# Patient Record
Sex: FEMALE | Race: WHITE | NOT HISPANIC OR LATINO | Employment: UNEMPLOYED
[De-identification: names, ages, dates, MRNs, and addresses within clinical notes are randomized per-mention and may not be internally consistent; named-entity substitution may affect disease eponyms.]

---

## 2018-03-30 ENCOUNTER — RECORDS - HEALTHEAST (OUTPATIENT)
Dept: ADMINISTRATIVE | Facility: OTHER | Age: 23
End: 2018-03-30

## 2018-11-25 ENCOUNTER — HEALTH MAINTENANCE LETTER (OUTPATIENT)
Age: 23
End: 2018-11-25

## 2019-05-12 ENCOUNTER — HOSPITAL ENCOUNTER (EMERGENCY)
Facility: CLINIC | Age: 24
Discharge: HOME | End: 2019-05-12
Attending: FAMILY MEDICINE | Admitting: FAMILY MEDICINE
Payer: COMMERCIAL

## 2019-05-12 VITALS
DIASTOLIC BLOOD PRESSURE: 88 MMHG | TEMPERATURE: 97 F | HEIGHT: 67 IN | HEART RATE: 111 BPM | RESPIRATION RATE: 18 BRPM | OXYGEN SATURATION: 97 % | BODY MASS INDEX: 19.62 KG/M2 | WEIGHT: 125 LBS | SYSTOLIC BLOOD PRESSURE: 125 MMHG

## 2019-05-12 DIAGNOSIS — Z20.2 STD EXPOSURE: ICD-10-CM

## 2019-05-12 DIAGNOSIS — N89.8 VAGINAL DISCHARGE: ICD-10-CM

## 2019-05-12 LAB
SPECIMEN SOURCE: NORMAL
WET PREP SPEC: NORMAL

## 2019-05-12 PROCEDURE — 99282 EMERGENCY DEPT VISIT SF MDM: CPT | Mod: Z6 | Performed by: FAMILY MEDICINE

## 2019-05-12 PROCEDURE — 99283 EMERGENCY DEPT VISIT LOW MDM: CPT | Performed by: FAMILY MEDICINE

## 2019-05-12 PROCEDURE — 87491 CHLMYD TRACH DNA AMP PROBE: CPT | Performed by: FAMILY MEDICINE

## 2019-05-12 PROCEDURE — 87210 SMEAR WET MOUNT SALINE/INK: CPT | Performed by: FAMILY MEDICINE

## 2019-05-12 PROCEDURE — 87591 N.GONORRHOEAE DNA AMP PROB: CPT | Performed by: FAMILY MEDICINE

## 2019-05-12 ASSESSMENT — MIFFLIN-ST. JEOR: SCORE: 1364.63

## 2019-05-12 NOTE — ED PROVIDER NOTES
History     Chief Complaint   Patient presents with     Exposure to STD     pt was seen and tx for gonorrhea and chlamydia.  she wants a recheck after finishing course of anbx. pt cont to have vag dc      HPI  Milly Augustin is a 23 year old female with history of gonorrhea and chlamydia who presents to the ED with STD follow-up. The patient was treated for gonorrhea and chlamydia one month ago, diagnosed with a urine test.  She says that she took a pill twice daily for 10 days which I suspect was doxycycline.  She is currently not having dysuria.  She reports only mild vaginal discharge, but no other symptoms. She presents to the ED today to make sure STDs are gone and to check for vaginal infection..     Allergies:  No Known Allergies    Problem List:    Patient Active Problem List    Diagnosis Date Noted     Contraception 05/14/2015     Priority: Medium     Major depressive disorder, single episode, mild (H) 01/15/2012     Priority: Medium     Anxiety 12/20/2011     Priority: Medium     Psoriasis 04/14/2009     Priority: Medium     Pain in limb 08/13/2007     Priority: Medium        Past Medical History:    Past Medical History:   Diagnosis Date     Depressive disorder 1/15/12     Other abnormal heart sounds        Past Surgical History:    No past surgical history on file.    Family History:    Family History   Problem Relation Age of Onset     Depression Sister      Anxiety Disorder Sister      Asthma Sister        Social History:  Marital Status:  Single [1]  Social History     Tobacco Use     Smoking status: Current Every Day Smoker     Packs/day: 0.50     Years: 5.00     Pack years: 2.50     Types: Cigarettes, Bidis     Start date: 11/13/2013     Smokeless tobacco: Never Used   Substance Use Topics     Alcohol use: No     Comment: 10/22/13 treatment     Drug use: No     Comment: pot, K2, amphetimines none since treatment        Medications:      No current outpatient medications on file.      Review of  "Systems   Constitutional: Negative for chills and fever.   Respiratory: Negative for cough.    Gastrointestinal: Negative for diarrhea and nausea.   Genitourinary: Positive for vaginal discharge (Mild). Negative for dysuria and frequency.   Musculoskeletal: Negative.    Skin: Negative for rash.   Neurological: Negative.    Unremarkable unless noted above.     Physical Exam   BP: 125/88  Pulse: 111  Temp: 97  F (36.1  C)  Resp: 18  Height: 170.2 cm (5' 7\")  Weight: 56.7 kg (125 lb)  SpO2: 97 %      Physical Exam   Constitutional: She appears well-developed.   HENT:   Head: Normocephalic.   Eyes: No scleral icterus.   Neck: No thyromegaly present.   Cardiovascular: Regular rhythm.   Pulmonary/Chest: No respiratory distress.   Abdominal: She exhibits no distension.   Neurological: Coordination normal.   Skin: No rash noted.   Psychiatric: She has a normal mood and affect.       ED Course        Procedures               Critical Care time:  none               Results for orders placed or performed during the hospital encounter of 05/12/19 (from the past 24 hour(s))   Wet prep   Result Value Ref Range    Specimen Description Vagina     Wet Prep No yeast seen     Wet Prep No clue cells seen     Wet Prep No Trichomonas seen     Wet Prep Few  WBC'S seen          Medications - No data to display     10:12 AM Patient Assessed.     Assessments & Plan (with Medical Decision Making)     Patient presented as noted above.  Chlamydia and gonorrhea labs are pending.  Her wet prep was unremarkable.  She was advised of results.  She can contact the [place] clinic for results in a couple of days.  Safe sex emphasized.  She voices understanding of recommendations.      I have reviewed the nursing notes.    I have reviewed the findings, diagnosis, plan and need for follow up with the patient.          Medication List      There are no discharge medications for this visit.         Final diagnoses:   STD exposure   Vaginal discharge "     This document serves as a record of the services and decisions personally performed and made by William Estrada MD. It was created on HIS/HER behalf by   Kayleigh Kaye, a trained medical scribe. The creation of this document is based the provider's statements to the medical scribe.  Kayleigh Kaye 10:38 AM 5/12/2019    Provider:   The information in this document, created by the medical scribe for me, accurately reflects the services I personally performed and the decisions made by me. I have reviewed and approved this document for accuracy prior to leaving the patient care area.  William Estrada MD 10:38 AM 5/12/2019 5/12/2019   [facility] EMERGENCY DEPARTMENT     William Estrada MD  05/13/19 0705

## 2019-05-12 NOTE — ED NOTES
Patient treated for STD one month ago.  Now here today to make sure its gone away.  States does have some vaginal discharge.  No urinary symptoms.  No other complaints

## 2019-05-12 NOTE — DISCHARGE INSTRUCTIONS
For STD test results, I suggest that you contact the [facility] at [phone].    The wet prep test was negative for vaginal infection.

## 2019-05-12 NOTE — ED AVS SNAPSHOT
[facility] Emergency Department  [address] [address]  Phone:  [phone]  Fax:  [phone]                                    Milly Augustin   MRN: [id number]    Department:  [facility] Emergency Department   Date of Visit:  5/12/2019           After Visit Summary Signature Page    I have received my discharge instructions, and my questions have been answered. I have discussed any challenges I see with this plan with the nurse or doctor.    ..........................................................................................................................................  Patient/Patient Representative Signature      ..........................................................................................................................................  Patient Representative Print Name and Relationship to Patient    ..................................................               ................................................  Date                                   Time    ..........................................................................................................................................  Reviewed by Signature/Title    ...................................................              ..............................................  Date                                               Time          22EPIC Rev 08/18

## 2019-05-13 LAB
C TRACH DNA SPEC QL NAA+PROBE: NEGATIVE
N GONORRHOEA DNA SPEC QL NAA+PROBE: NEGATIVE
SPECIMEN SOURCE: NORMAL
SPECIMEN SOURCE: NORMAL

## 2019-05-13 ASSESSMENT — ENCOUNTER SYMPTOMS
DIARRHEA: 0
COUGH: 0
NEUROLOGICAL NEGATIVE: 1
FREQUENCY: 0
MUSCULOSKELETAL NEGATIVE: 1
DYSURIA: 0
FEVER: 0
CHILLS: 0
NAUSEA: 0

## 2019-11-03 ENCOUNTER — HEALTH MAINTENANCE LETTER (OUTPATIENT)
Age: 24
End: 2019-11-03

## 2020-01-29 ENCOUNTER — HOSPITAL ENCOUNTER (EMERGENCY)
Facility: CLINIC | Age: 25
Discharge: HOME | End: 2020-01-29
Attending: NURSE PRACTITIONER | Admitting: NURSE PRACTITIONER
Payer: COMMERCIAL

## 2020-01-29 VITALS
TEMPERATURE: 98.1 F | SYSTOLIC BLOOD PRESSURE: 135 MMHG | RESPIRATION RATE: 16 BRPM | OXYGEN SATURATION: 99 % | DIASTOLIC BLOOD PRESSURE: 97 MMHG

## 2020-01-29 DIAGNOSIS — K08.89 PAIN, DENTAL: ICD-10-CM

## 2020-01-29 PROCEDURE — 99283 EMERGENCY DEPT VISIT LOW MDM: CPT | Mod: 25 | Performed by: NURSE PRACTITIONER

## 2020-01-29 PROCEDURE — 64400 NJX AA&/STRD TRIGEMINAL NRV: CPT | Performed by: NURSE PRACTITIONER

## 2020-01-29 PROCEDURE — 64400 NJX AA&/STRD TRIGEMINAL NRV: CPT | Mod: Z6 | Performed by: NURSE PRACTITIONER

## 2020-01-29 PROCEDURE — 99284 EMERGENCY DEPT VISIT MOD MDM: CPT | Mod: 25 | Performed by: NURSE PRACTITIONER

## 2020-01-29 RX ORDER — AMOXICILLIN 500 MG/1
500 CAPSULE ORAL 3 TIMES DAILY
Qty: 30 CAPSULE | Refills: 0 | Status: SHIPPED | OUTPATIENT
Start: 2020-01-29 | End: 2020-07-19

## 2020-01-29 RX ORDER — OXYCODONE AND ACETAMINOPHEN 5; 325 MG/1; MG/1
1 TABLET ORAL EVERY 6 HOURS PRN
Qty: 12 TABLET | Refills: 0 | Status: SHIPPED | OUTPATIENT
Start: 2020-01-29 | End: 2020-07-19

## 2020-01-29 NOTE — ED AVS SNAPSHOT
[facility] Emergency Department  [address] [place] [address]  Phone:  [phone]  Fax:  [phone]                                    Milly Augustin   MRN: [id number]    Department:  [facility] Emergency Department   Date of Visit:  1/29/2020           After Visit Summary Signature Page    I have received my discharge instructions, and my questions have been answered. I have discussed any challenges I see with this plan with the nurse or doctor.    ..........................................................................................................................................  Patient/Patient Representative Signature      ..........................................................................................................................................  Patient Representative Print Name and Relationship to Patient    ..................................................               ................................................  Date                                   Time    ..........................................................................................................................................  Reviewed by Signature/Title    ...................................................              ..............................................  Date                                               Time          22EPIC Rev 08/18

## 2020-01-30 NOTE — ED PROVIDER NOTES
History     Chief Complaint   Patient presents with     Dental Pain     HPI  Milly Augustin is a 24 year old female who presents to the emergency room today with left upper dental pain for the last 2 days.  Patient reports the pain is progressive in etiology, laying down makes it worse, chewing makes it worse.  Ibuprofen/Tylenol have helped minimally.  Patient denies any facial swelling, fever, chills, nausea, vomiting.  Patient has not been to a dentist in at least over 2 years.    Allergies:  Allergies   Allergen Reactions     No Known Allergies        Problem List:    Patient Active Problem List    Diagnosis Date Noted     Contraception 05/14/2015     Priority: Medium     Major depressive disorder, single episode, mild (H) 01/15/2012     Priority: Medium     Anxiety 12/20/2011     Priority: Medium     Psoriasis 04/14/2009     Priority: Medium     Pain in limb 08/13/2007     Priority: Medium        Past Medical History:    Past Medical History:   Diagnosis Date     Depressive disorder 1/15/12     Other abnormal heart sounds        Past Surgical History:    No past surgical history on file.    Family History:    Family History   Problem Relation Age of Onset     Depression Sister      Anxiety Disorder Sister      Asthma Sister        Social History:  Marital Status:  Single [1]  Social History     Tobacco Use     Smoking status: Current Every Day Smoker     Packs/day: 0.50     Years: 5.00     Pack years: 2.50     Types: Cigarettes, Bidis     Start date: 11/13/2013     Smokeless tobacco: Never Used   Substance Use Topics     Alcohol use: No     Comment: 10/22/13 treatment     Drug use: No     Comment: pot, K2, amphetimines none since treatment        Medications:    amoxicillin (AMOXIL) 500 MG capsule  oxyCODONE-acetaminophen (PERCOCET) 5-325 MG tablet          Review of Systems   All other systems reviewed and are negative.      Physical Exam   BP: (!) 135/97  Heart Rate: 110  Temp: 98.1  F (36.7  C)  Resp:  16  SpO2: 99 %      Physical Exam  HENT:      Head: Normocephalic.      Mouth/Throat:      Mouth: Mucous membranes are moist.   Eyes:      Extraocular Movements: Extraocular movements intact.   Neck:      Musculoskeletal: Normal range of motion.   Cardiovascular:      Rate and Rhythm: Normal rate.   Pulmonary:      Effort: Pulmonary effort is normal.   Musculoskeletal: Normal range of motion.   Lymphadenopathy:      Cervical: No cervical adenopathy.   Skin:     General: Skin is warm.      Capillary Refill: Capillary refill takes less than 2 seconds.   Neurological:      General: No focal deficit present.      Mental Status: She is alert.   Psychiatric:         Mood and Affect: Mood normal.         ED Course        Procedures    Dental Block:     After discussion of the different options to treat her dental pain we elected to perform a dental block. Informed consent was given to include a discussion of the possible side affects of a dental block. These include: pain, bleeding, infection, nerve injury, allergic reaction, and, although very rare, even a possible severe reaction to the point of death. Milly agrees to proceed with the dental block.     A time out was performed. The area of pain was verified.    Using Marcaine 0.5% with epinephrine 2 ml was used to perform the dental block to the left upper front . Milly tolerated the procedure/injection well with sign of adverse reaction.    I will monitor the patient over the next 20 minutes both for signs of improved pain and for possible side effect of he block procedure.      No results found for this or any previous visit (from the past 24 hour(s)).    Medications   dental kit ([facility]) (Dental Kit) 1 KIT kit (has no administration in time range)       Assessments & Plan (with Medical Decision Making).  Dental pain, likely early abscess.  Dental block performed after risk/benefits discussed per patient request with good relief in her pain.  No fluctuant abscess that  requires I&D on exam.  No evidence of Peter's angina.  Overall poor dentition with large amounts of plaque buildup and gingivitis.  Tooth #6 is affected.  Dental provider list given to patient for follow-up.  Patient will be started on amoxicillin.  Continue ibuprofen/Tylenol for pain, Percocet for severe pain, narcotic safety discussed as well as maximum Tylenol dosing per day.  I did discuss with patient reasons to return to the emergency room, she is agreeable to plan of care and discharged in stable condition.     I have reviewed the nursing notes.        New Prescriptions    AMOXICILLIN (AMOXIL) 500 MG CAPSULE    Take 1 capsule (500 mg) by mouth 3 times daily for 10 days    OXYCODONE-ACETAMINOPHEN (PERCOCET) 5-325 MG TABLET    Take 1 tablet by mouth every 6 hours as needed for moderate to severe pain       Final diagnoses:   Pain, dental       1/29/2020   [facility] EMERGENCY DEPARTMENT     Esthela Long, FATOUMATA CNP  01/29/20 9703

## 2020-07-19 ENCOUNTER — HOSPITAL ENCOUNTER (EMERGENCY)
Facility: CLINIC | Age: 25
Discharge: HOME | End: 2020-07-20
Attending: FAMILY MEDICINE | Admitting: FAMILY MEDICINE
Payer: COMMERCIAL

## 2020-07-19 DIAGNOSIS — F41.1 GENERALIZED ANXIETY DISORDER: ICD-10-CM

## 2020-07-19 DIAGNOSIS — N39.0 URINARY TRACT INFECTION WITHOUT HEMATURIA, SITE UNSPECIFIED: ICD-10-CM

## 2020-07-19 DIAGNOSIS — Z71.1 CONCERN ABOUT STD IN FEMALE WITHOUT DIAGNOSIS: ICD-10-CM

## 2020-07-19 DIAGNOSIS — R30.0 DYSURIA: ICD-10-CM

## 2020-07-19 LAB
ALBUMIN UR-MCNC: NEGATIVE MG/DL
APPEARANCE UR: CLEAR
BACTERIA #/AREA URNS HPF: ABNORMAL /HPF
BILIRUB UR QL STRIP: NEGATIVE
COLOR UR AUTO: YELLOW
GLUCOSE UR STRIP-MCNC: NEGATIVE MG/DL
HCG UR QL: NEGATIVE
HGB UR QL STRIP: ABNORMAL
KETONES UR STRIP-MCNC: NEGATIVE MG/DL
LEUKOCYTE ESTERASE UR QL STRIP: ABNORMAL
NITRATE UR QL: NEGATIVE
PH UR STRIP: 5 PH (ref 5–7)
RBC #/AREA URNS AUTO: 5 /HPF (ref 0–2)
SOURCE: ABNORMAL
SP GR UR STRIP: 1.01 (ref 1–1.03)
SQUAMOUS #/AREA URNS AUTO: 1 /HPF (ref 0–1)
UROBILINOGEN UR STRIP-MCNC: 0 MG/DL (ref 0–2)
WBC #/AREA URNS AUTO: 30 /HPF (ref 0–5)
WBC CLUMPS #/AREA URNS HPF: PRESENT /HPF

## 2020-07-19 PROCEDURE — 81001 URINALYSIS AUTO W/SCOPE: CPT | Performed by: FAMILY MEDICINE

## 2020-07-19 PROCEDURE — 99283 EMERGENCY DEPT VISIT LOW MDM: CPT | Performed by: FAMILY MEDICINE

## 2020-07-19 PROCEDURE — 87491 CHLMYD TRACH DNA AMP PROBE: CPT | Performed by: FAMILY MEDICINE

## 2020-07-19 PROCEDURE — 81025 URINE PREGNANCY TEST: CPT | Performed by: FAMILY MEDICINE

## 2020-07-19 PROCEDURE — 87661 TRICHOMONAS VAGINALIS AMPLIF: CPT | Performed by: FAMILY MEDICINE

## 2020-07-19 PROCEDURE — 87186 SC STD MICRODIL/AGAR DIL: CPT | Performed by: FAMILY MEDICINE

## 2020-07-19 PROCEDURE — 87088 URINE BACTERIA CULTURE: CPT | Performed by: FAMILY MEDICINE

## 2020-07-19 PROCEDURE — 25000132 ZZH RX MED GY IP 250 OP 250 PS 637: Performed by: FAMILY MEDICINE

## 2020-07-19 PROCEDURE — 99283 EMERGENCY DEPT VISIT LOW MDM: CPT | Mod: Z6 | Performed by: FAMILY MEDICINE

## 2020-07-19 PROCEDURE — 87591 N.GONORRHOEAE DNA AMP PROB: CPT | Performed by: FAMILY MEDICINE

## 2020-07-19 PROCEDURE — 87086 URINE CULTURE/COLONY COUNT: CPT | Performed by: FAMILY MEDICINE

## 2020-07-19 RX ORDER — AZITHROMYCIN 250 MG/1
1000 TABLET, FILM COATED ORAL ONCE
Status: COMPLETED | OUTPATIENT
Start: 2020-07-19 | End: 2020-07-19

## 2020-07-19 RX ORDER — SULFAMETHOXAZOLE/TRIMETHOPRIM 800-160 MG
1 TABLET ORAL 2 TIMES DAILY
Qty: 6 TABLET | Refills: 0 | Status: SHIPPED | OUTPATIENT
Start: 2020-07-19 | End: 2020-07-22 | Stop reason: ALTCHOICE

## 2020-07-19 RX ORDER — PHENAZOPYRIDINE HYDROCHLORIDE 200 MG/1
200 TABLET, FILM COATED ORAL 3 TIMES DAILY PRN
Qty: 9 TABLET | Refills: 0 | Status: SHIPPED | OUTPATIENT
Start: 2020-07-19 | End: 2020-07-22

## 2020-07-19 RX ADMIN — AZITHROMYCIN MONOHYDRATE 1000 MG: 250 TABLET ORAL at 22:56

## 2020-07-19 ASSESSMENT — ENCOUNTER SYMPTOMS
HEMATURIA: 1
DIAPHORESIS: 0
DYSURIA: 1
BLOOD IN STOOL: 0
PALPITATIONS: 0
FREQUENCY: 1
ABDOMINAL PAIN: 0
COUGH: 0
NAUSEA: 0
CHILLS: 0
VOMITING: 0
SORE THROAT: 0
HEADACHES: 0
WHEEZING: 0
DIARRHEA: 0
FEVER: 0
SHORTNESS OF BREATH: 0
SINUS PRESSURE: 0
CONSTIPATION: 0

## 2020-07-19 NOTE — ED AVS SNAPSHOT
[facility] Emergency Department  [address] [address]  Phone:  [phone]  Fax:  [phone]                                    Milly Augustin   MRN: [id number]    Department:  [facility] Emergency Department   Date of Visit:  7/19/2020           After Visit Summary Signature Page    I have received my discharge instructions, and my questions have been answered. I have discussed any challenges I see with this plan with the nurse or doctor.    ..........................................................................................................................................  Patient/Patient Representative Signature      ..........................................................................................................................................  Patient Representative Print Name and Relationship to Patient    ..................................................               ................................................  Date                                   Time    ..........................................................................................................................................  Reviewed by Signature/Title    ...................................................              ..............................................  Date                                               Time          22EPIC Rev 08/18

## 2020-07-20 VITALS
HEART RATE: 87 BPM | TEMPERATURE: 98.5 F | DIASTOLIC BLOOD PRESSURE: 80 MMHG | SYSTOLIC BLOOD PRESSURE: 120 MMHG | OXYGEN SATURATION: 99 % | RESPIRATION RATE: 16 BRPM | WEIGHT: 120 LBS | BODY MASS INDEX: 18.79 KG/M2

## 2020-07-20 LAB
C TRACH DNA SPEC QL NAA+PROBE: NEGATIVE
N GONORRHOEA DNA SPEC QL NAA+PROBE: NEGATIVE
SPECIMEN SOURCE: NORMAL
T VAGINALIS DNA SPEC QL NAA+PROBE: NORMAL

## 2020-07-20 NOTE — ED PROVIDER NOTES
History     Chief Complaint   Patient presents with     Dysuria     burning and freq- UTI vs STD Hx of Gonorrhea and Chylmadia.      Anxiety     desires anxiety meds     HPI  Milly Augustin is a 24 year old female who presents with a history of anxiety, depression, Mirena IUD in place for the last 4 years.  History of recurrent STD with one episode being gonorrhea and the other episode being chlamydia.  She has been treated in the past, and does not want to have the empiric ceftriaxone injection but would be interested in having the empiric Zithromax and also excluding urinary tract infection.  She has no vaginal discharge.  She does have dysuria urgency frequency and hematuria.  No history of HIV exposure or syphilis exposure and does not desire this testing.  She has no pelvic pain.  There is some mild bilateral flank region pain but also has back pain history.  No associated fever.  No systemic symptoms.    Allergies:  Allergies   Allergen Reactions     No Known Allergies        Problem List:    Patient Active Problem List    Diagnosis Date Noted     Contraception 05/14/2015     Priority: Medium     Major depressive disorder, single episode, mild (H) 01/15/2012     Priority: Medium     Anxiety 12/20/2011     Priority: Medium     Psoriasis 04/14/2009     Priority: Medium     Pain in limb 08/13/2007     Priority: Medium        Past Medical History:    Past Medical History:   Diagnosis Date     Depressive disorder 1/15/12     Other abnormal heart sounds        Past Surgical History:    No past surgical history on file.    Family History:    Family History   Problem Relation Age of Onset     Depression Sister      Anxiety Disorder Sister      Asthma Sister        Social History:  Marital Status:  Single [1]  Social History     Tobacco Use     Smoking status: Current Every Day Smoker     Packs/day: 0.50     Years: 5.00     Pack years: 2.50     Types: Cigarettes, Bidis     Start date: 11/13/2013     Smokeless  tobacco: Never Used   Substance Use Topics     Alcohol use: No     Comment: 10/22/13 treatment     Drug use: No     Comment: pot, K2, amphetimines none since treatment        Medications:    No current outpatient medications on file.        Review of Systems   Constitutional: Negative for chills, diaphoresis and fever.   HENT: Negative for ear pain, sinus pressure and sore throat.    Eyes: Negative for visual disturbance.   Respiratory: Negative for cough, shortness of breath and wheezing.    Cardiovascular: Negative for chest pain and palpitations.   Gastrointestinal: Negative for abdominal pain, blood in stool, constipation, diarrhea, nausea and vomiting.   Genitourinary: Positive for dysuria, frequency, hematuria and urgency.   Skin: Negative for rash.   Neurological: Negative for headaches.   All other systems reviewed and are negative.        Physical Exam   BP: 120/80  Pulse: 115  Temp: 98.5  F (36.9  C)  Resp: 14  Weight: 54.4 kg (120 lb)  SpO2: 98 %      Physical Exam  Constitutional:       General: She is not in acute distress.     Appearance: She is not ill-appearing or diaphoretic.   Neck:      Musculoskeletal: Neck supple.   Cardiovascular:      Rate and Rhythm: Normal rate and regular rhythm.   Pulmonary:      Effort: Pulmonary effort is normal. No respiratory distress.      Breath sounds: Normal breath sounds. No stridor. No wheezing or rhonchi.   Abdominal:      General: Bowel sounds are normal. There is no distension.      Tenderness: There is no right CVA tenderness or left CVA tenderness.   Musculoskeletal:      Right lower leg: No edema.      Left lower leg: No edema.   Skin:     Coloration: Skin is not pale.      Findings: No rash.   Neurological:      General: No focal deficit present.      Mental Status: She is alert.              ED Course        Procedures               Critical Care time:  none               Results for orders placed or performed during the hospital encounter of 07/19/20  (from the past 24 hour(s))   UA with Microscopic   Result Value Ref Range    Color Urine Yellow     Appearance Urine Clear     Glucose Urine Negative NEG^Negative mg/dL    Bilirubin Urine Negative NEG^Negative    Ketones Urine Negative NEG^Negative mg/dL    Specific Gravity Urine 1.010 1.003 - 1.035    Blood Urine Moderate (A) NEG^Negative    pH Urine 5.0 5.0 - 7.0 pH    Protein Albumin Urine Negative NEG^Negative mg/dL    Urobilinogen mg/dL 0.0 0.0 - 2.0 mg/dL    Nitrite Urine Negative NEG^Negative    Leukocyte Esterase Urine Small (A) NEG^Negative    Source Midstream Urine     WBC Urine 30 (H) 0 - 5 /HPF    RBC Urine 5 (H) 0 - 2 /HPF    WBC Clumps Present (A) NEG^Negative /HPF    Bacteria Urine Few (A) NEG^Negative /HPF    Squamous Epithelial /HPF Urine 1 0 - 1 /HPF   HCG qualitative urine (UPT)   Result Value Ref Range    HCG Qual Urine Negative NEG^Negative       Medications   azithromycin (ZITHROMAX) tablet 1,000 mg (has no administration in time range)       Assessments & Plan (with Medical Decision Making)     MDM: Milly Augustin is a 24 year old female who presented with a history of STD and now with dysuria urgency frequency hematuria.  Afebrile.  No abdominal pain no pelvic pain and no vaginal discharge.  The differential includes STD versus UTI and she does have 35 white cells and small leukocyte esterase on her urinalysis this may represent UTI and will treat as such but also obtained GC chlamydia testing as well as trichomonas testing.  She accepts the oral Zithromax thousand milligrams but declines the Rocephin injection until diagnosis is made of gonorrhea.  Should practice safe sex.  She has been in plant IUD Mirena.  Pregnancy test is negative.  Pyridium will also be given for UTI symptoms and Septra DS twice daily 3 days for UTI.  No signs of pyelonephritis.      We also discussed anxiety.  She told me that she is used Xanax off the street and this offered relief but she does not want  "benzodiazepines but she would like daily medication we discussed the use of Effexor which I think is very effective but also in combination with cognitive behavioral therapy for him anxiety and I placed a consult for this and asked her to follow-up in clinic to discuss daily medications including Effexor.  We also discussed the book anxiety and phobia workbook by Eric.      I have reviewed the nursing notes.    I have reviewed the findings, diagnosis, plan and need for follow up with the patient.       New Prescriptions    No medications on file       Final diagnoses:   Dysuria   Concern about STD in female without diagnosis - I would recommend giving the rocephin injection today in addition to the zithromax while awaiting testing, but I understand your reluctance and will therefore await testing.  also consider other STD testing including syphilis, HIV.  return for pelvic, abdominal pain   Generalized anxiety disorder - follow-up primary provider and consider effexor starting at 37.5 mg orally daily and advancing. This however is an adjunct to the more important management of cognitivbe behavioral therapy (CBT) by psycology which I strongly recommend.  I also recommend a book \"Anxiety and Phobia Workbook\" by Emilee.   Urinary tract infection without hematuria, site unspecified - signs of urinary tract infection, but this could be STD  as well. await testing.  take septra. for 3 days       7/19/2020   [facility] EMERGENCY DEPARTMENT     Nabeel Strickland MD  07/19/20 6414    "

## 2020-07-20 NOTE — DISCHARGE INSTRUCTIONS
"  ICD-10-CM    1. Dysuria  R30.0 UA with Microscopic     HCG qualitative urine (UPT)   2. Concern about STD in female without diagnosis  Z71.1     I would recommend giving the rocephin injection today in addition to the zithromax while awaiting testing, but I understand your reluctance and will therefore await testing.  also consider other STD testing including syphilis, HIV.  return for pelvic, abdominal pain   3. Generalized anxiety disorder  F41.1 MENTAL HEALTH REFERRAL  - Adult; Outpatient Treatment; Individual/Couples/Family/Group Therapy/Health Psychology; [facility] [phone]; We will contact you to schedule the appointment or please call with any questions    follow-up primary provider and consider effexor starting at 37.5 mg orally daily and advancing. This however is an adjunct to the more important management of cognitivbe behavioral therapy (CBT) by psycology which I strongly recommend.  I also recommend a book \"Anxiety and Phobia Workbook\" by Emilee.   4. Urinary tract infection without hematuria, site unspecified  N39.0     signs of urinary tract infection, but this could be STD  as well. await testing.  take septra. for 3 days   take pyridium for burning on urinatioj    "

## 2020-07-20 NOTE — RESULT ENCOUNTER NOTE
Final result for both N. Gonorrhoeae PCR and Chlamydia Trachomatis PCR are NEGATIVE.  No treatment or change in treatment per [facility] ED Lab Result protocol.

## 2020-07-20 NOTE — ED NOTES
C/o burning with urination 2 days duration  Denies vaginal discharge or odors  Male partners with no protection, has h/o STIs  Had mirena iud in place

## 2020-07-20 NOTE — RESULT ENCOUNTER NOTE
Emergency Dept/Urgent Care discharge antibiotic (if prescribed): Sulfamethoxazole-Trimethoprim (Bactrim DS, Septra DS) 800-160 mg PO tablet,  1 tablet by mouth 2 times daily for 3 days.  Date of Rx (if applicable):  7/19/20  No changes in treatment per Urine culture protocol.

## 2020-07-22 ENCOUNTER — TELEPHONE (OUTPATIENT)
Dept: EMERGENCY MEDICINE | Facility: CLINIC | Age: 25
End: 2020-07-22

## 2020-07-22 DIAGNOSIS — N39.0 URINARY TRACT INFECTION: ICD-10-CM

## 2020-07-22 LAB
BACTERIA SPEC CULT: ABNORMAL
BACTERIA SPEC CULT: ABNORMAL
Lab: ABNORMAL
SPECIMEN SOURCE: ABNORMAL

## 2020-07-22 RX ORDER — CEPHALEXIN 500 MG/1
500 CAPSULE ORAL 2 TIMES DAILY
Qty: 14 CAPSULE | Refills: 0 | Status: SHIPPED | OUTPATIENT
Start: 2020-07-22 | End: 2020-07-29

## 2020-07-22 NOTE — TELEPHONE ENCOUNTER
[employer] [facility] Emergency Department Lab result notification [Adult-Female]    [place] ED lab result protocol used  Urine Culture Protocol    Reason for call  Notify of lab results, assess symptoms,  review ED providers recommendations/discharge instructions (if necessary) and advise per ED lab result f/u protocol    Lab Result (including Rx patient on, if applicable)  Final Urine Culture Report on 7/22/20   Emergency Dept/Urgent Care discharge antibiotic prescribed: Sulfamethoxazole-Trimethoprim (Bactrim DS, Septra DS) 800-160 mg PO tablet,  1 tablet by mouth 2 times daily for 3 days.   #1. Bacteria, 10,000 to 50,000 colonies/ml Escherichia coli,  is [RESISTANT] to antibiotic.   Change in treatment as per [place] ED Lab result protocol.     Information table from ED Provider visit on 7/19-7/20  Symptoms reported at ED visit (Chief complaint, HPI) Milly Augustin is a 24 year old female who presents with a history of anxiety, depression, Mirena IUD in place for the last 4 years.  History of recurrent STD with one episode being gonorrhea and the other episode being chlamydia.  She has been treated in the past, and does not want to have the empiric ceftriaxone injection but would be interested in having the empiric Zithromax and also excluding urinary tract infection.  She has no vaginal discharge.  She does have dysuria urgency frequency and hematuria.  No history of HIV exposure or syphilis exposure and does not desire this testing.  She has no pelvic pain.  There is some mild bilateral flank region pain but also has back pain history.  No associated fever.  No systemic symptoms.   Significant Medical hx, if applicable (i.e. CKD, diabetes) None.   Allergies Allergies   Allergen Reactions     No Known Allergies       Weight, if applicable Wt Readings from Last 2 Encounters:   07/19/20 54.4 kg (120 lb)   05/12/19 56.7 kg (125 lb)      Coumadin/Warfarin [Yes /No] No   Creatinine Level (mg/dl) Creatinine    Date Value Ref Range Status   11/03/2004 0.50 0.20 - 0.70 mg/dL Final      Creatinine clearance (ml/min), if applicable Creatinine clearance cannot be calculated (Patient's most recent lab result is older than the maximum 10 days allowed.)   Pregnant (Yes/No/NA) No   Breastfeeding (Yes/No/NA) No   ED providers Impression and Plan (applicable information) MDM: Milly Augustin is a 24 year old female who presented with a history of STD and now with dysuria urgency frequency hematuria.  Afebrile.  No abdominal pain no pelvic pain and no vaginal discharge.  The differential includes STD versus UTI and she does have 35 white cells and small leukocyte esterase on her urinalysis this may represent UTI and will treat as such but also obtained GC chlamydia testing as well as trichomonas testing.  She accepts the oral Zithromax thousand milligrams but declines the Rocephin injection until diagnosis is made of gonorrhea.  Should practice safe sex.  She has been in plant IUD Mirena.  Pregnancy test is negative.  Pyridium will also be given for UTI symptoms and Septra DS twice daily 3 days for UTI.  No signs of pyelonephritis.        We also discussed anxiety.  She told me that she is used Xanax off the street and this offered relief but she does not want benzodiazepines but she would like daily medication we discussed the use of Effexor which I think is very effective but also in combination with cognitive behavioral therapy for him anxiety and I placed a consult for this and asked her to follow-up in clinic to discuss daily medications including Effexor.  We also discussed the book anxiety and phobia workbook by Eric.   ED diagnosis Dysuria   ED provider Nabeel Strickland MD      RN Assessment (Patient s current Symptoms), include time called.  [Insert Left message here if message left]  Milly ARIAS Recommendations/Instructions per [place] ED lab result protocol  Patient notified of lab result and treatment  recommendations.  Rx for Keflex sent to [Pharmacy - [facility]].  RN reviewed information about stopping Bactrim once Keflex obtained.    Please Contact your PCP clinic or return to the Emergency department if your:    Symptoms return.    Symptoms do not improve after 3 days on antibiotic.    Symptoms do not resolve after completing antibiotic.    Symptoms worsen or other concerning symptom's.    PCP follow-up Questions asked: YES       Penny Coronado RN    [facility] [place]   Lung Nodule and ED Lab Results F/U RN  Epic pool (ED late result f/u RN) : [id number]   # [phone]    Copy of Lab result   Contains abnormal data  Urine Culture Aerobic Bacterial   Order: [id number]   Status:  Final result   Visible to patient:  Yes (MyChart) Dx:  Dysuria   Specimen Information:  Midstream Urine          Component  3d ago   Specimen Description  Midstream Urine     Special Requests  Specimen received in preservative     Culture Micro  Abnormal     10,000 to 50,000 colonies/mL   Escherichia coli     Culture Micro  10,000 to 50,000 colonies/mL   urogenital alva   Susceptibility testing not routinely done    Resulting Agency  [facility]    Susceptibility      Escherichia coli      JAMIE      AMPICILLIN  >=32 ug/mL  Resistant      AMPICILLIN/SULBACTAM  16 ug/mL  Intermediate      CEFAZOLIN  <=4 ug/mL  Sensitive1      CEFEPIME  <=1 ug/mL  Sensitive      CEFOXITIN  <=4 ug/mL  Sensitive      CEFTAZIDIME  <=1 ug/mL  Sensitive      CEFTRIAXONE  <=1 ug/mL  Sensitive      CIPROFLOXACIN  <=0.25 ug/mL  Sensitive      GENTAMICIN  >=16 ug/mL  Resistant      LEVOFLOXACIN  <=0.12 ug/mL  Sensitive      NITROFURANTOIN  64 ug/mL  Intermediate      Piperacillin/Tazo  <=4 ug/mL  Sensitive      TOBRAMYCIN  2 ug/mL  Sensitive      Trimethoprim/Sulfa  >=16/304 ug/mL  Resistant

## 2020-11-16 ENCOUNTER — HEALTH MAINTENANCE LETTER (OUTPATIENT)
Age: 25
End: 2020-11-16

## 2020-11-18 ENCOUNTER — HOSPITAL ENCOUNTER (EMERGENCY)
Facility: CLINIC | Age: 25
Discharge: LEFT BEFORE DISCHARGE | End: 2020-11-18
Attending: NURSE PRACTITIONER
Payer: COMMERCIAL

## 2021-06-01 VITALS — BODY MASS INDEX: 18.94 KG/M2 | WEIGHT: 120 LBS | BODY MASS INDEX: 18.79 KG/M2 | HEIGHT: 67 IN

## 2021-09-18 ENCOUNTER — HEALTH MAINTENANCE LETTER (OUTPATIENT)
Age: 26
End: 2021-09-18

## 2022-01-08 ENCOUNTER — HEALTH MAINTENANCE LETTER (OUTPATIENT)
Age: 27
End: 2022-01-08

## 2022-11-03 ENCOUNTER — OFFICE VISIT (OUTPATIENT)
Dept: URGENT CARE | Facility: URGENT CARE | Age: 27
End: 2022-11-03
Payer: COMMERCIAL

## 2022-11-03 VITALS
HEART RATE: 99 BPM | WEIGHT: 133 LBS | BODY MASS INDEX: 20.83 KG/M2 | DIASTOLIC BLOOD PRESSURE: 83 MMHG | TEMPERATURE: 97.2 F | SYSTOLIC BLOOD PRESSURE: 124 MMHG | OXYGEN SATURATION: 100 %

## 2022-11-03 DIAGNOSIS — K52.9 GASTROENTERITIS: Primary | ICD-10-CM

## 2022-11-03 PROCEDURE — 99203 OFFICE O/P NEW LOW 30 MIN: CPT | Performed by: FAMILY MEDICINE

## 2022-11-03 RX ORDER — ONDANSETRON 4 MG/1
4 TABLET, ORALLY DISINTEGRATING ORAL EVERY 6 HOURS PRN
Qty: 8 TABLET | Refills: 0 | Status: SHIPPED | OUTPATIENT
Start: 2022-11-03

## 2022-11-03 NOTE — PROGRESS NOTES
(K52.9) Gastroenteritis  (primary encounter diagnosis)  Comment:   Plan: ondansetron (ZOFRAN ODT) 4 MG ODT tab            Vomiting 4 times earlier today.  Benign exam.  We will try Zofran and small amounts of clear fluids.  Instructions discussed and patient agreeable.  Advised to go to ER if symptoms worsening.      CHIEF COMPLAINT    Dizziness, vomiting.      HISTORY    This 27-year-old woman had onset of vomiting this morning.  She said that she had 4 episodes.  None in the last 2 hours.  She has tried drinking a little water.  She has minimal abdominal cramping.  Has not noticed fever.      REVIEW OF SYSTEMS    No sore throat.  No cough or shortness of breath.  No urinary problems.  No rashes.      EXAM  /83   Pulse 99   Temp 97.2  F (36.2  C) (Tympanic)   Wt 60.3 kg (133 lb)   SpO2 100%   BMI 20.83 kg/m      Alert patient, NAD.  HEENT unremarkable.  Mucous membranes moist.  Neck without adenopathy or mass.  Lungs clear.  Abdomen nondistended, no focal tenderness, normal bowel sounds.

## 2022-11-03 NOTE — PATIENT INSTRUCTIONS
Take prescribed medication as directed.    Try clear liquids (like a sport drink) 1 oz every 15 min.    If worsening, go to E R.

## 2022-11-19 ENCOUNTER — HEALTH MAINTENANCE LETTER (OUTPATIENT)
Age: 27
End: 2022-11-19

## 2023-04-09 ENCOUNTER — HEALTH MAINTENANCE LETTER (OUTPATIENT)
Age: 28
End: 2023-04-09

## 2024-01-28 ENCOUNTER — HOSPITAL ENCOUNTER (EMERGENCY)
Facility: CLINIC | Age: 29
Discharge: HOME | End: 2024-01-28
Attending: FAMILY MEDICINE | Admitting: FAMILY MEDICINE
Payer: COMMERCIAL

## 2024-01-28 VITALS
BODY MASS INDEX: 19.62 KG/M2 | SYSTOLIC BLOOD PRESSURE: 114 MMHG | TEMPERATURE: 97.6 F | WEIGHT: 125 LBS | HEART RATE: 104 BPM | RESPIRATION RATE: 18 BRPM | DIASTOLIC BLOOD PRESSURE: 80 MMHG | HEIGHT: 67 IN | OXYGEN SATURATION: 98 %

## 2024-01-28 DIAGNOSIS — R21 RASH: ICD-10-CM

## 2024-01-28 LAB
CLUE CELLS: ABNORMAL
HCG SERPL QL: NEGATIVE
HOLD SPECIMEN: NORMAL
TRICHOMONAS, WET PREP: ABNORMAL
WBC'S/HIGH POWER FIELD, WET PREP: ABNORMAL
YEAST, WET PREP: ABNORMAL

## 2024-01-28 PROCEDURE — 84703 CHORIONIC GONADOTROPIN ASSAY: CPT | Performed by: FAMILY MEDICINE

## 2024-01-28 PROCEDURE — 36415 COLL VENOUS BLD VENIPUNCTURE: CPT | Performed by: FAMILY MEDICINE

## 2024-01-28 PROCEDURE — 99283 EMERGENCY DEPT VISIT LOW MDM: CPT | Performed by: FAMILY MEDICINE

## 2024-01-28 PROCEDURE — 86780 TREPONEMA PALLIDUM: CPT | Performed by: FAMILY MEDICINE

## 2024-01-28 PROCEDURE — 87491 CHLMYD TRACH DNA AMP PROBE: CPT | Performed by: FAMILY MEDICINE

## 2024-01-28 PROCEDURE — 86696 HERPES SIMPLEX TYPE 2 TEST: CPT | Performed by: FAMILY MEDICINE

## 2024-01-28 PROCEDURE — 87210 SMEAR WET MOUNT SALINE/INK: CPT | Performed by: FAMILY MEDICINE

## 2024-01-28 PROCEDURE — 87591 N.GONORRHOEAE DNA AMP PROB: CPT | Performed by: FAMILY MEDICINE

## 2024-01-28 PROCEDURE — 87389 HIV-1 AG W/HIV-1&-2 AB AG IA: CPT | Performed by: FAMILY MEDICINE

## 2024-01-28 PROCEDURE — 86695 HERPES SIMPLEX TYPE 1 TEST: CPT | Performed by: FAMILY MEDICINE

## 2024-01-28 RX ORDER — VALACYCLOVIR HYDROCHLORIDE 1 G/1
1000 TABLET, FILM COATED ORAL 2 TIMES DAILY
Qty: 20 TABLET | Refills: 0 | Status: SHIPPED | OUTPATIENT
Start: 2024-01-28 | End: 2024-02-07

## 2024-01-29 ENCOUNTER — TELEPHONE (OUTPATIENT)
Dept: EMERGENCY MEDICINE | Facility: CLINIC | Age: 29
End: 2024-01-29
Payer: COMMERCIAL

## 2024-01-29 LAB
C TRACH DNA SPEC QL NAA+PROBE: NEGATIVE
HIV 1+2 AB+HIV1 P24 AG SERPL QL IA: NONREACTIVE
HSV1 IGG SERPL QL IA: 5.39 INDEX
HSV1 IGG SERPL QL IA: ABNORMAL
HSV2 IGG SERPL QL IA: >23.6 INDEX
HSV2 IGG SERPL QL IA: ABNORMAL
N GONORRHOEA DNA SPEC QL NAA+PROBE: NEGATIVE
T PALLIDUM AB SER QL: NONREACTIVE

## 2024-01-29 NOTE — LETTER
January 29, 2024        Milly Augustin  [address] [address]          Dear Milly Augustin:    You were seen in the [facility] Emergency Department at [facility] EMERGENCY DEPT on 1/28/2024.  We are unable to reach you by phone, so we are sending you this letter.     It is important that you call [facility] Emergency Department lab result nurse at [phone], as we have information to relay to you AND/OR we MAY have to make some changes in your treatment.    Best time to call back is between 9AM and 5:30PM, 7 days a week.      Sincerely,     [facility] Emergency Department Lab Result RN  [phone]

## 2024-01-29 NOTE — DISCHARGE INSTRUCTIONS
RETURN TO THE EMERGENCY ROOM FOR THE FOLLOWING:    Severely worsened pain, new abdominal or pelvic pain.  Vomiting and dehydration, fever greater than 101, or at anytime for any concern.    FOLLOW UP:    Multiple lab tests are pending at the time of discharge.  Close follow-up recommended to follow up on these.  Referral order placed at the time of discharge, expect a phone call over the next few days to help with scheduling.    TREATMENT RECOMMENDATIONS:    Valacyclovir for possible herpes simplex.    NURSE ADVICE LINE:  [phone] or [phone]

## 2024-01-29 NOTE — TELEPHONE ENCOUNTER
[facility]    Reason for call: Lab Result Notification     Lab Result (including Rx patient on, if applicable).  If culture, copy of lab report at bottom.  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  [facility] Emergency Dept discharge antiviral prescribed: Valacyclovir (VALTREX) 1000 mg tablet, 1 tablet (1,000 mg) by mouth 2 times daily for 10 days  Recommendations in treatment per [facility] ED lab result Herpes Simple protocol      Creatinine Level (mg/dl)   Creatinine   Date Value Ref Range Status   09/11/2019 0.74 0.60 - 1.10 mg/dL Final   11/03/2004 0.50 0.20 - 0.70 mg/dL Final     Creatinine POCT   Date Value Ref Range Status   09/11/2019 0.6 0.6 - 1.1 mg/dL Final    Creatinine clearance (ml/min), if applicable    Creatinine clearance cannot be calculated (Patient's most recent lab result is older than the maximum 365 days allowed.)     Patient's current Symptoms:   Number on file does not connect.  Letter Sent        Mert Young RN

## 2024-02-04 NOTE — TELEPHONE ENCOUNTER
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 Pt requested to get transferred to [facility] to establish care with [facility] PCP.    Iwona Asencio RN/[place] Nurse Advisor    "

## 2024-06-16 ENCOUNTER — HEALTH MAINTENANCE LETTER (OUTPATIENT)
Age: 29
End: 2024-06-16

## 2025-01-21 ENCOUNTER — HOSPITAL ENCOUNTER (EMERGENCY)
Facility: CLINIC | Age: 30
Discharge: HOME | End: 2025-01-22
Payer: COMMERCIAL

## 2025-01-21 DIAGNOSIS — R41.82 ALTERED MENTAL STATUS, UNSPECIFIED ALTERED MENTAL STATUS TYPE: ICD-10-CM

## 2025-01-21 DIAGNOSIS — T40.2X1A OPIOID OVERDOSE, ACCIDENTAL OR UNINTENTIONAL, INITIAL ENCOUNTER (H): ICD-10-CM

## 2025-01-21 PROBLEM — F19.90 SUBSTANCE USE DISORDER: Status: ACTIVE | Noted: 2017-09-19

## 2025-01-21 LAB
ALBUMIN SERPL BCG-MCNC: 4.1 G/DL (ref 3.5–5.2)
ALP SERPL-CCNC: 77 U/L (ref 40–150)
ALT SERPL W P-5'-P-CCNC: 15 U/L (ref 0–50)
ANION GAP SERPL CALCULATED.3IONS-SCNC: 13 MMOL/L (ref 7–15)
APAP SERPL-MCNC: <5 UG/ML (ref 10–30)
AST SERPL W P-5'-P-CCNC: 36 U/L (ref 0–45)
BASOPHILS # BLD AUTO: 0.1 10E3/UL (ref 0–0.2)
BASOPHILS NFR BLD AUTO: 1 %
BILIRUB SERPL-MCNC: 0.3 MG/DL
BUN SERPL-MCNC: 12.7 MG/DL (ref 6–20)
CALCIUM SERPL-MCNC: 9.1 MG/DL (ref 8.8–10.4)
CHLORIDE SERPL-SCNC: 101 MMOL/L (ref 98–107)
CREAT SERPL-MCNC: 0.76 MG/DL (ref 0.51–0.95)
EGFRCR SERPLBLD CKD-EPI 2021: >90 ML/MIN/1.73M2
EOSINOPHIL # BLD AUTO: 0.5 10E3/UL (ref 0–0.7)
EOSINOPHIL NFR BLD AUTO: 8 %
ERYTHROCYTE [DISTWIDTH] IN BLOOD BY AUTOMATED COUNT: 12 % (ref 10–15)
ETHANOL SERPL-MCNC: <0.01 G/DL
GLUCOSE SERPL-MCNC: 81 MG/DL (ref 70–99)
HCO3 SERPL-SCNC: 25 MMOL/L (ref 22–29)
HCT VFR BLD AUTO: 43.5 % (ref 35–47)
HGB BLD-MCNC: 14.9 G/DL (ref 11.7–15.7)
HOLD SPECIMEN: NORMAL
IMM GRANULOCYTES # BLD: 0 10E3/UL
IMM GRANULOCYTES NFR BLD: 0 %
LYMPHOCYTES # BLD AUTO: 2 10E3/UL (ref 0.8–5.3)
LYMPHOCYTES NFR BLD AUTO: 28 %
MCH RBC QN AUTO: 31.4 PG (ref 26.5–33)
MCHC RBC AUTO-ENTMCNC: 34.3 G/DL (ref 31.5–36.5)
MCV RBC AUTO: 92 FL (ref 78–100)
MONOCYTES # BLD AUTO: 0.5 10E3/UL (ref 0–1.3)
MONOCYTES NFR BLD AUTO: 7 %
NEUTROPHILS # BLD AUTO: 4 10E3/UL (ref 1.6–8.3)
NEUTROPHILS NFR BLD AUTO: 56 %
NRBC # BLD AUTO: 0 10E3/UL
NRBC BLD AUTO-RTO: 0 /100
PLATELET # BLD AUTO: 248 10E3/UL (ref 150–450)
POTASSIUM SERPL-SCNC: 4 MMOL/L (ref 3.4–5.3)
PROT SERPL-MCNC: 7.1 G/DL (ref 6.4–8.3)
RBC # BLD AUTO: 4.75 10E6/UL (ref 3.8–5.2)
SALICYLATES SERPL-MCNC: <0.3 MG/DL
SODIUM SERPL-SCNC: 139 MMOL/L (ref 135–145)
WBC # BLD AUTO: 7.2 10E3/UL (ref 4–11)

## 2025-01-21 PROCEDURE — 93005 ELECTROCARDIOGRAM TRACING: CPT

## 2025-01-21 PROCEDURE — 82077 ASSAY SPEC XCP UR&BREATH IA: CPT

## 2025-01-21 PROCEDURE — 96372 THER/PROPH/DIAG INJ SC/IM: CPT

## 2025-01-21 PROCEDURE — 99284 EMERGENCY DEPT VISIT MOD MDM: CPT

## 2025-01-21 PROCEDURE — 82247 BILIRUBIN TOTAL: CPT

## 2025-01-21 PROCEDURE — 80143 DRUG ASSAY ACETAMINOPHEN: CPT

## 2025-01-21 PROCEDURE — 250N000011 HC RX IP 250 OP 636

## 2025-01-21 PROCEDURE — 36415 COLL VENOUS BLD VENIPUNCTURE: CPT

## 2025-01-21 PROCEDURE — 84155 ASSAY OF PROTEIN SERUM: CPT

## 2025-01-21 PROCEDURE — 93010 ELECTROCARDIOGRAM REPORT: CPT

## 2025-01-21 PROCEDURE — 99285 EMERGENCY DEPT VISIT HI MDM: CPT

## 2025-01-21 PROCEDURE — 85004 AUTOMATED DIFF WBC COUNT: CPT

## 2025-01-21 PROCEDURE — 80179 DRUG ASSAY SALICYLATE: CPT

## 2025-01-21 RX ORDER — NALOXONE HYDROCHLORIDE 0.4 MG/ML
0.4 INJECTION, SOLUTION INTRAMUSCULAR; INTRAVENOUS; SUBCUTANEOUS
Status: DISCONTINUED | OUTPATIENT
Start: 2025-01-21 | End: 2025-01-22 | Stop reason: HOSPADM

## 2025-01-21 RX ORDER — NALOXONE HYDROCHLORIDE 0.4 MG/ML
0.2 INJECTION, SOLUTION INTRAMUSCULAR; INTRAVENOUS; SUBCUTANEOUS
Status: DISCONTINUED | OUTPATIENT
Start: 2025-01-21 | End: 2025-01-22 | Stop reason: HOSPADM

## 2025-01-21 RX ORDER — NALOXONE HYDROCHLORIDE 0.4 MG/ML
INJECTION, SOLUTION INTRAMUSCULAR; INTRAVENOUS; SUBCUTANEOUS
Status: DISCONTINUED
Start: 2025-01-21 | End: 2025-01-21 | Stop reason: HOSPADM

## 2025-01-21 RX ADMIN — NALOXONE HYDROCHLORIDE 0.2 MG: 0.4 INJECTION, SOLUTION INTRAMUSCULAR; INTRAVENOUS; SUBCUTANEOUS at 20:10

## 2025-01-21 ASSESSMENT — ACTIVITIES OF DAILY LIVING (ADL)
ADLS_ACUITY_SCORE: 41

## 2025-01-21 ASSESSMENT — COLUMBIA-SUICIDE SEVERITY RATING SCALE - C-SSRS
6. HAVE YOU EVER DONE ANYTHING, STARTED TO DO ANYTHING, OR PREPARED TO DO ANYTHING TO END YOUR LIFE?: NO
2. HAVE YOU ACTUALLY HAD ANY THOUGHTS OF KILLING YOURSELF IN THE PAST MONTH?: NO
1. IN THE PAST MONTH, HAVE YOU WISHED YOU WERE DEAD OR WISHED YOU COULD GO TO SLEEP AND NOT WAKE UP?: NO

## 2025-01-22 VITALS
HEIGHT: 68 IN | HEART RATE: 80 BPM | OXYGEN SATURATION: 99 % | TEMPERATURE: 94.9 F | WEIGHT: 145 LBS | RESPIRATION RATE: 9 BRPM | SYSTOLIC BLOOD PRESSURE: 109 MMHG | DIASTOLIC BLOOD PRESSURE: 69 MMHG | BODY MASS INDEX: 21.98 KG/M2

## 2025-01-22 ASSESSMENT — ACTIVITIES OF DAILY LIVING (ADL)
ADLS_ACUITY_SCORE: 41
ADLS_ACUITY_SCORE: 41

## 2025-01-22 NOTE — ED PROVIDER NOTES
"[facility]  Emergency Department Visit Note    PATIENT:  Milly Augustin     29 year old     female      [id number]    Chief complaint:  Chief Complaint   Patient presents with    Drug Overdose     Found down, minimal info from bystanders, given narcan x 4 prior to arrival.           History of present illness:  Patient is a 29 year old female with polysubstance use disorder, depression presenting for evaluation of altered mental status.    History from EMS is that they were called due to concern for overdose.  On their arrival patient was saturating 96 to 100% on room air.  Unknown initial respiratory rate.  Sounds like PD had administered roughly 4 mg of intranasal naloxone x 3.  EMS administered 1 mg IV naloxone with improvement in mental status.  Initially only responsive to pain, then more interactive though not conversant.  EMS did administer 3 BVM assisted breaths though patient was fighting this so they discontinued.  History from other people on the scene is that patient perhaps overdosed on fentanyl versus GHB.    Patient does not remember what happened tonight.  States the last time she intentionally used fentanyl was over 1 year ago.  She thinks she \"probably\" had methamphetamine tonight and thinks that she likely smoked it, though she does not know for sure.      Review of Systems:  As in HPI above    BP (!) 131/105   Pulse 89   Temp (!) 94.9  F (34.9  C) (Tympanic)   Resp 17   Ht 1.727 m (5' 8\")   Wt 65.8 kg (145 lb)   SpO2 98%   BMI 22.05 kg/m        Physical Exam:  Constitutional: Laying in hospital bed, moaning, eyes open, follows commands but does not answer questions, grimacing and localizing pain for IV start  HEENT: normocephalic, atraumatic, pupils 3mm, equal, round, and reactive to light, sclerae anicteric, and extraocular motions intact  Neck: no stridor  Cardiovascular: regular rate and rhythm and no murmurs, rubs, or extra heart sounds  Pulmonary: Cheyne-Allen " respirations initially, tachypneic with rapid shallow breathing with more alertness, followed by multiple periods of apnea though without desaturation.  Did wake up to noxious stimulus and again took rapid shallow breaths.  Lungs are clear.  Abdominal: soft, non-tender, non-distended  Extremities/MSK: no peripheral edema  Skin: warm, dry  Neurologic: Moves all 4 extremity spontaneously, no apparent visual field cut, able to perform  strength exam though weak bilaterally, able to flex bilateral lower extremities up off of the bed  Psychiatric: appears anxious and tearful      MDM:  Patient is a 29 year old female with above history presenting for evaluation of concern for altered mental status and drug overdose.    Vitals on arrival overall reassuring, satting well on room air, though intermittently bradypneic.  Low temperature likely environmental. Exam notable for decreased level of consciousness though without focal neurologic deficit.    Patient saturating well, other vitals stable.  Not fully oriented or mentating normally though alert and follows commands on arrival.  IV access obtained.  Administered 0.2 mg IV naloxone with improvement in mental status, at that time patient more alert and that is when she was able to provide the above history.  Tearful, unsure exactly what happened though does report intermittent drug use.  She reports this is typically inhaled and not done intravenous.    Suspect that her degree of altered mentation is due to drug use tonight.  Sounds like she has more recently been using methamphetamine, the recreational supply of which is almost certainly contaminated with fentanyl or other opioid derivatives which likely led to her presentation today particularly with improvement after opioid reversal agent.  Does not have other sympathomimetic, GHB, cholinergic, anticholinergic toxidrome.    She will need close monitoring and is at extremely high risk of respiratory decompensation in  the setting of opioid overdose.  Maintaining on close monitoring including cardiac monitoring, end-tidal, pulse oximetry.  Naloxone order set.  Planning for basic labs and ECG.    Disposition pending above workup and response to therapeutics. Remainder of ED course below.    ED COURSE:  ED Course as of 01/22/25 0018   Tue Jan 21, 2025 2023 EKG 12-lead, tracing only  ECG:  - Ventricular rate 91 bpm, regular  - OH, QRS, QT intervals normal  - Axis normal  - no ST segment or T wave changes concerning for acute ischemia  - Comparison to prior ECGs: none available  - My independent interpretation: NSR   2041 Labs reviewed, within normal limits.    Ongoing monitoring for any signs of redevelopment of respiratory decompensation.   Wed Jan 22, 2025 0017 Patient doing well from respiratory status, has not required repeat dosing.  Monitoring until 2 AM, if no recurrent need for naloxone or desaturations would be stable for discharge.  Mother has been attentive and at bedside throughout the night.       Encounter Diagnoses:  Final diagnoses:   Altered mental status, unspecified altered mental status type   Opioid overdose, accidental or unintentional, initial encounter (H)       Final disposition: pending further workup, patient signed out to oncoming physician, Dr. Steven Juares MD  1/21/2025  8:17 PM   Emergency Medicine  [facility]      Bunny Juares MD  01/22/25 0018

## 2025-01-22 NOTE — DISCHARGE INSTRUCTIONS
You were seen in the emergency department today for concern for drug overdose. We monitored you in the emergency department and your breathing and respiratory status remained reassuring.    This was potentially a life-threatening overdose.  You should know that generally in the recreational drug supply, including methamphetamine and other recreational drugs, often these drugs are contaminated with fentanyl which I think you probably overdosed on today.  This can be life-threatening and overdose.  I am giving a prescription for the fentanyl and opioid reversal agent called naloxone, also called Narcan.  You should take this with you at all times and know how to use either for yourself or others in the event of opioid overdose.    Please follow up with your primary doctor as needed for ongoing evaluation and management of your symptoms.    Return to the emergency department with new or worsening symptoms that you find concerning.

## 2025-01-23 LAB
ATRIAL RATE - MUSE: 91 BPM
DIASTOLIC BLOOD PRESSURE - MUSE: NORMAL MMHG
INTERPRETATION ECG - MUSE: NORMAL
P AXIS - MUSE: 23 DEGREES
PR INTERVAL - MUSE: 128 MS
QRS DURATION - MUSE: 60 MS
QT - MUSE: 382 MS
QTC - MUSE: 469 MS
R AXIS - MUSE: 34 DEGREES
SYSTOLIC BLOOD PRESSURE - MUSE: NORMAL MMHG
T AXIS - MUSE: 43 DEGREES
VENTRICULAR RATE- MUSE: 91 BPM

## 2025-06-21 ENCOUNTER — HEALTH MAINTENANCE LETTER (OUTPATIENT)
Age: 30
End: 2025-06-21